# Patient Record
Sex: MALE | Race: ASIAN | NOT HISPANIC OR LATINO
[De-identification: names, ages, dates, MRNs, and addresses within clinical notes are randomized per-mention and may not be internally consistent; named-entity substitution may affect disease eponyms.]

---

## 2023-10-16 ENCOUNTER — APPOINTMENT (OUTPATIENT)
Dept: UROLOGY | Facility: CLINIC | Age: 53
End: 2023-10-16
Payer: MEDICAID

## 2023-10-16 VITALS
BODY MASS INDEX: 24.45 KG/M2 | DIASTOLIC BLOOD PRESSURE: 75 MMHG | SYSTOLIC BLOOD PRESSURE: 123 MMHG | HEIGHT: 69.69 IN | OXYGEN SATURATION: 96 % | TEMPERATURE: 97.5 F | RESPIRATION RATE: 18 BRPM | WEIGHT: 168.9 LBS | HEART RATE: 89 BPM

## 2023-10-16 DIAGNOSIS — F17.200 NICOTINE DEPENDENCE, UNSPECIFIED, UNCOMPLICATED: ICD-10-CM

## 2023-10-16 DIAGNOSIS — Z78.9 OTHER SPECIFIED HEALTH STATUS: ICD-10-CM

## 2023-10-16 DIAGNOSIS — Z86.39 PERSONAL HISTORY OF OTHER ENDOCRINE, NUTRITIONAL AND METABOLIC DISEASE: ICD-10-CM

## 2023-10-16 PROBLEM — Z00.00 ENCOUNTER FOR PREVENTIVE HEALTH EXAMINATION: Status: ACTIVE | Noted: 2023-10-16

## 2023-10-16 PROCEDURE — 99204 OFFICE O/P NEW MOD 45 MIN: CPT | Mod: 25

## 2023-10-16 PROCEDURE — 51798 US URINE CAPACITY MEASURE: CPT

## 2023-10-16 RX ORDER — ATORVASTATIN CALCIUM 20 MG/1
20 TABLET, FILM COATED ORAL
Refills: 0 | Status: ACTIVE | COMMUNITY

## 2023-10-16 RX ORDER — METFORMIN HYDROCHLORIDE 500 MG/1
500 TABLET, COATED ORAL
Refills: 0 | Status: ACTIVE | COMMUNITY

## 2023-10-16 RX ORDER — ASPIRIN 81 MG
81 TABLET, DELAYED RELEASE (ENTERIC COATED) ORAL
Refills: 0 | Status: ACTIVE | COMMUNITY

## 2023-10-16 RX ORDER — METOPROLOL SUCCINATE 25 MG/1
25 TABLET, EXTENDED RELEASE ORAL
Refills: 0 | Status: ACTIVE | COMMUNITY

## 2023-10-16 RX ORDER — LOSARTAN POTASSIUM 50 MG/1
50 TABLET, FILM COATED ORAL
Refills: 0 | Status: ACTIVE | COMMUNITY

## 2023-10-16 RX ORDER — ACETAMINOPHEN 325 MG/1
325 TABLET ORAL
Refills: 0 | Status: ACTIVE | COMMUNITY

## 2023-10-16 RX ORDER — GUAIFENESIN, DEXTROMETHORPHAN HBR 600; 30 MG/1; MG/1
30-600 TABLET ORAL
Refills: 0 | Status: ACTIVE | COMMUNITY

## 2023-10-16 RX ORDER — ENEMA 19; 7 G/133ML; G/133ML
7-19 ENEMA RECTAL
Qty: 133 | Refills: 0 | Status: ACTIVE | COMMUNITY
Start: 2023-10-16 | End: 1900-01-01

## 2023-10-19 ENCOUNTER — APPOINTMENT (OUTPATIENT)
Age: 53
End: 2023-10-19

## 2023-10-19 LAB — PSA SERPL-MCNC: 5.34 NG/ML

## 2023-11-13 ENCOUNTER — NON-APPOINTMENT (OUTPATIENT)
Age: 53
End: 2023-11-13

## 2023-11-27 ENCOUNTER — APPOINTMENT (OUTPATIENT)
Dept: UROLOGY | Facility: CLINIC | Age: 53
End: 2023-11-27
Payer: MEDICAID

## 2023-11-27 VITALS
TEMPERATURE: 97.2 F | OXYGEN SATURATION: 100 % | RESPIRATION RATE: 18 BRPM | HEART RATE: 84 BPM | BODY MASS INDEX: 23.89 KG/M2 | SYSTOLIC BLOOD PRESSURE: 102 MMHG | DIASTOLIC BLOOD PRESSURE: 68 MMHG | WEIGHT: 165 LBS

## 2023-11-27 PROCEDURE — 99214 OFFICE O/P EST MOD 30 MIN: CPT

## 2024-02-26 ENCOUNTER — APPOINTMENT (OUTPATIENT)
Dept: UROLOGY | Facility: CLINIC | Age: 54
End: 2024-02-26

## 2024-04-01 ENCOUNTER — APPOINTMENT (OUTPATIENT)
Dept: UROLOGY | Facility: CLINIC | Age: 54
End: 2024-04-01
Payer: MEDICAID

## 2024-04-01 VITALS
BODY MASS INDEX: 24.86 KG/M2 | HEART RATE: 89 BPM | DIASTOLIC BLOOD PRESSURE: 77 MMHG | WEIGHT: 171.7 LBS | SYSTOLIC BLOOD PRESSURE: 115 MMHG | RESPIRATION RATE: 18 BRPM | OXYGEN SATURATION: 97 % | TEMPERATURE: 97.7 F

## 2024-04-01 DIAGNOSIS — N13.8 BENIGN PROSTATIC HYPERPLASIA WITH LOWER URINARY TRACT SYMPMS: ICD-10-CM

## 2024-04-01 DIAGNOSIS — N40.1 BENIGN PROSTATIC HYPERPLASIA WITH LOWER URINARY TRACT SYMPMS: ICD-10-CM

## 2024-04-01 DIAGNOSIS — R97.20 ELEVATED PROSTATE, SPECIFIC ANTIGEN [PSA]: ICD-10-CM

## 2024-04-01 PROCEDURE — 99213 OFFICE O/P EST LOW 20 MIN: CPT

## 2024-04-01 RX ORDER — TAMSULOSIN HYDROCHLORIDE 0.4 MG/1
0.4 CAPSULE ORAL
Qty: 90 | Refills: 1 | Status: ACTIVE | COMMUNITY
Start: 2023-11-27 | End: 1900-01-01

## 2024-04-01 NOTE — PHYSICAL EXAM
[General Appearance - Well Developed] : well developed [Normal Appearance] : normal appearance [General Appearance - Well Nourished] : well nourished [] : no respiratory distress [General Appearance - In No Acute Distress] : no acute distress [Respiration, Rhythm And Depth] : normal respiratory rhythm and effort [Exaggerated Use Of Accessory Muscles For Inspiration] : no accessory muscle use

## 2024-04-02 LAB — PSA SERPL-MCNC: 5.63 NG/ML

## 2024-04-02 RX ORDER — ENEMA 19; 7 G/133ML; G/133ML
7-19 ENEMA RECTAL
Qty: 133 | Refills: 0 | Status: ACTIVE | COMMUNITY
Start: 2024-04-02 | End: 1900-01-01

## 2024-04-02 NOTE — HISTORY OF PRESENT ILLNESS
[FreeTextEntry1] : Patient is 53 year old M with primary medical history of T2DM and hyperlipidemia who presents for elevated PSA, LUTS.  HPI: He was found to have elevated PSA by routine check up recently, This is first time he was told PSA was elevated. He is otherwise asymptomatic. Denied gross hematuria, dysuria. He reports PSA 3.79 in 2022.  PSA trends: 5.32 in 9/2023 3.79 in 8/2022 2.2 in 7/2021 2.1 in 7/2020 2.4 in 5/2016 2.5 in 5/2015 3.0 in 7/2014 3.2 in 5/2013  He reports urinary hesitancy, consistence stream, weak stream, no straining, no post void dribbling, nocturia x 0-1. He is not bothered by LUTS. He recently started Christopher Palmetto after he was told about elevated PSA but did not notice any change in LUTS and did not continue because of LUTS. Does not take other BPH medications. Denied family history of prostate cancer, or other urological malignancy. Father has enlarged prostate.  Never smoke, social ETOH, no illicit drug use. He is currently experiencing no gross hematuria, dysuria, abdominal pain, flank pain, nausea/vomiting or fever/chills.  Interval hx: 11/27/23 repeated PSA 5.34 on 10/19/2023 MRI of prostate on 11/13/23 showed no clinically significant prostate cancer identified, non-specific linear/wedge shape on T2 hypointensities noted in bilateral peripheral zones which may represent sequelae of prior prostatitis. PiRADS 2. 40 ml glands.   4/1/24 He was started on flomax last visit - notes that there was improvement in LUTS  - less urinary stream, urinary hesitancy, Nocturia x 0-1.

## 2024-04-02 NOTE — ASSESSMENT
[FreeTextEntry1] : Patient is a 52 yo M who presents for elevated PSA, mild LUTS.  Mild LUTS - improved with flomax.  Renewed For elevated PSA, he is very hesitant to undergo prostate biopsy.  Prior prostate MRI PiRADS 2. Will obtain PSA today and follow closely, d/w pt that if PSA continues to rise he should strongly reconsider pbx F/u 6 mos

## 2024-04-03 ENCOUNTER — NON-APPOINTMENT (OUTPATIENT)
Age: 54
End: 2024-04-03

## 2024-04-15 ENCOUNTER — APPOINTMENT (OUTPATIENT)
Dept: UROLOGY | Facility: CLINIC | Age: 54
End: 2024-04-15

## 2024-04-17 ENCOUNTER — NON-APPOINTMENT (OUTPATIENT)
Age: 54
End: 2024-04-17

## 2024-09-30 ENCOUNTER — APPOINTMENT (OUTPATIENT)
Dept: UROLOGY | Facility: CLINIC | Age: 54
End: 2024-09-30